# Patient Record
Sex: MALE | Race: WHITE | Employment: OTHER | URBAN - METROPOLITAN AREA
[De-identification: names, ages, dates, MRNs, and addresses within clinical notes are randomized per-mention and may not be internally consistent; named-entity substitution may affect disease eponyms.]

---

## 2018-03-13 ENCOUNTER — HOSPITAL ENCOUNTER (OUTPATIENT)
Dept: OCCUPATIONAL THERAPY | Facility: CLINIC | Age: 73
Setting detail: THERAPIES SERIES
End: 2018-03-13
Attending: INTERNAL MEDICINE
Payer: MEDICARE

## 2018-03-13 PROCEDURE — G8988 SELF CARE GOAL STATUS: HCPCS | Mod: GO,CI | Performed by: OCCUPATIONAL THERAPIST

## 2018-03-13 PROCEDURE — 97166 OT EVAL MOD COMPLEX 45 MIN: CPT | Mod: GO | Performed by: OCCUPATIONAL THERAPIST

## 2018-03-13 PROCEDURE — 97110 THERAPEUTIC EXERCISES: CPT | Mod: GO | Performed by: OCCUPATIONAL THERAPIST

## 2018-03-13 PROCEDURE — G8987 SELF CARE CURRENT STATUS: HCPCS | Mod: GO,CK | Performed by: OCCUPATIONAL THERAPIST

## 2018-03-13 PROCEDURE — 40000839 ZZH STATISTIC HAND THERAPY VISIT: Performed by: OCCUPATIONAL THERAPIST

## 2018-03-13 NOTE — PROGRESS NOTES
[facility]      OUTPATIENT OCCUPATIONAL THERAPY HAND EVALUATION  PLAN OF TREATMENT FOR OUTPATIENT REHABILITATION  (COMPLETE FOR INITIAL CLAIMS ONLY)  Patient's Last Name, First Name, M.I.  YOB: 1945  Martinez Montenegro                        Provider s Name: [facility] Medical Record No.  [id number]     Onset Date: 08/12/16    Start of Care Date: 03/13/18   Type:     ___PT  _X_OT   ___SLP    Medical Diagnosis: left CTS   Occupational Therapy Diagnosis:  Decreased functional use of the left hand due to pain and numbing effecting his ability to maintain grasp onto objects or good sleep; decreased BALD/IADLs independence.    Visits from SOC: 1      _________________________________________________________________________________  Plan of Treatment/Functional Goals:  Planned Therapy Interventions:  Ultrasound, ROM, Stretching, Education of splint wear, care, fit and precautions, Joint Protection Instruction, Self Care/Home Management, Home Program     Goals        1. Goal Target Task: Don/Doff pants, Don/Doff socks, Botton pants, Zipping       Goal Target Performance Level: Mild difficulty       Due Date: 04/27/18                   2. Goal Target Task: Hold dish rag and wash dishes, Sweep floors, Hold and lift pan, Open a tight or new jar       Goal Target Performance Level: Mild difficulty       Due Date: 04/27/18                   3.  Goal Target Task:  steering wheel, Pull car door open       Goal Target Performance Level: Mild difficulty       Due Date: 04/27/18     4. Goal Target Task: Staying asleep       Goal Target Performance Level: Mild difficulty       Due Date: 04/27/18     Treatment Frequency: Therapy Frequency: 2x week   Predicated Duration of Therapy Intervention:  Predicted Duration of Therapy Intervention (days/wks): 6 weeks    Keli Perera, OT         I  CERTIFY THE NEED FOR THESE SERVICES FURNISHED UNDER        THIS PLAN OF TREATMENT AND WHILE UNDER MY CARE .             Physician Signature               Date    X_____________________________________________________        Dr Sandra Burris         Certification Period:  03/13/18 to 04/27/18            Referring Physician:  Dr Sandra Burris    Initial Assessment        See Epic Evaluation Start of Care Date: 03/13/18          Thank you for referring Martinez To OT services to assist with pain relief and increased function of the hands.    If you have any questions or concerns, please contact me at [phone].    Keli Perera MA, OTR/L  [facility]

## 2018-03-13 NOTE — PROGRESS NOTES
"                        Occupational Therapy Evaluation     03/13/18 1258   Quick Adds   Quick Adds Certification;Fall Risk Screen   Therapy Certification   Certification date from 03/13/18   Certification date to 04/27/18   Medical Diagnosis left CTR   Certification I certify the need for these services furnished under this plan of treatment and while under my care.  (Physician co-signature of this document indicates review and certification of the therapy plan).   General Information/History   Start Of Care Date 03/13/18   Referring Physician Dr Sandra Burris   Orders Evaluate And Treat As Indicated   Orders Date 02/28/18   Medical Diagnosis CTS, laterality   Additional Occupational Profile Info/Pertinent history of current problem The patient is a 71 y/o male had been experiencing increased tigling in the left hand especially when sustain pinch/grasp and during night while sleeping. 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  Decreased functional use of the hands for BADL/IADLs.   Previous treatment or current condition wrist brace   How/Where did it occur With repetition/overuse;From insidious onset   Onset date of current episode/exacerbation 08/12/16   Chronicity Chronic   Hand Dominance Right   Affected side Left   Functional limitations perform activities of daily living;perform desired leisure / sports activities   Reported Symptoms Pain;Loss of Motion/Stiffness;Loss of strength;Numbness;Tingling   QuickDASH [Functional Disability Questionnaire; 0-100 (0=no dysfunction; 100=dysfunction)] (UEFI: 59/80 )   Important Activities boating, handling tools/furniture   Patient role/Employment history Retired   Patient/Family goals statement \"get rid of pain and numbness\"   Fall Risk Screen   Fall screen completed by OT   Have you fallen 2 or more times in the past year? No   Have you fallen and had an injury in the past year? No   Is patient a fall risk? No   Pain   Pain Primary Pain Report   Primary Pain " Report   Location left   Radiation Hand   Pain Quality Sharp;Aching   Frequency Constant   Scale 6/10   Pain Is Worse In The A.m.   Pain Is Exacerbated By Pinching;Gripping;Activity/movement   Pain Is Relieved By Splints  (movement)   Progression Since Onset Gradually Improving   Edema   Overall  - Left Moderate  (left ulnar distal aspect/styloid)   ROM   ROM AROM   AROM   AROM Elbow;Wrist   Wrist   Wrist Extension - Left 52   Wrist Flexion- Left 52   Wrist Supination- Left WNL   Wrist Pronation- Left WNL   Radial Deviation- Left 24   Ulnar Deviation- Left 22   Elbow   Elbow Extension- Left WFL   Elbow Flexion- Left WFL   Sensation Findings   Sensation Findings Sensation   Sensation   - Left Decreased Median Nerve Distribution   Strength   Strength Strength    Avg - Left 78#    Avg - Right 75#   Lateral Pinch - Left 22#   Lateral Pinch - Right 22#   Education Assessment   Preferred Learning Style Listening;Demonstration   Barriers to Learning No barriers   Therapy Interventions   Planned Therapy Interventions Ultrasound;ROM;Stretching;Education of splint wear, care, fit and precautions;Joint Protection Instruction;Self Care/Home Management;Home Program   Clinical Impression   Criteria for Skilled Therapeutic Interventions Met yes;treatment indicated   OT Diagnosis Decreased functional use of the left hand due to pain and numbing effecting his ability to maintain grasp onto objects or good sleep; decreased BALD/IADLs independence.   Influenced by the following impairments Pain;Edema;Decreased range of motion;Impaired sensation   Assessment of Occupational Performance 3-5 Performance Deficits   Identified Performance Deficits dressing, groomiung, home mgt. driving, leisure activiites   Clinical Decision Making (Complexity) Moderate complexity   Therapy Frequency 2x week    Predicted Duration of Therapy Intervention (days/wks) 6 weeks   Risks and Benefits of Treatment have been explained. Yes   Patient, Family  & other staff in agreement with plan of care Yes   Hand Goals   Hand Goals Driving;Sleeping;Household Chores;Dressing   Dressing   Current Functional Task Dressing LB;Buttoning;Zipping   Previous Performance Level Independent   Current Performance Level Moderate difficulty   Goal Target Task Don/Doff pants;Don/Doff socks;Botton pants;Zipping   Goal Target Performance Level Mild difficulty   Due Date 04/27/18   Household Chores   Current Functional Task Washing and drying dishes;Sweeping;Carrying   Previous Performance Level Independent   Current Performance Level Moderate difficulty   Goal Target Task Hold dish rag and wash dishes;Sweep floors;Hold and lift pan;Open a tight or new jar   Goal Target Performance Level Mild difficulty   Due Date 04/27/18   Driving   Current Functional Task Holding steering wheel;Opening car door   Previous Performance Level Independent   Current Performance Level Severe difficulty   Goal Target Task  steering wheel;Pull car door open   Goal Target Performance Level Mild difficulty   Due Date 04/27/18   Sleeping   Current Functional Task Sleeping through the night   Previous Performance Level Mild difficulty   Current Performance Level Severe difficulty   Goal Target Task Staying asleep   Goal Target Performance Level Mild difficulty   Due Date 04/27/18   Total Evaluation Time   Total Evaluation Time (Minutes) 30       Thank you for referring Martinez To OT services to assist with decrease pain and numbing of the hands to improve function.    If you have any questions or concerns, please contact me at [phone].    Keli Perera MA, OTR/L  [facility]

## 2018-03-19 ENCOUNTER — HOSPITAL ENCOUNTER (OUTPATIENT)
Dept: OCCUPATIONAL THERAPY | Facility: CLINIC | Age: 73
Setting detail: THERAPIES SERIES
End: 2018-03-19
Attending: INTERNAL MEDICINE
Payer: MEDICARE

## 2018-03-19 PROCEDURE — 97110 THERAPEUTIC EXERCISES: CPT | Mod: GO | Performed by: OCCUPATIONAL THERAPIST

## 2018-03-19 PROCEDURE — 97140 MANUAL THERAPY 1/> REGIONS: CPT | Mod: GO | Performed by: OCCUPATIONAL THERAPIST

## 2018-03-19 PROCEDURE — 97035 APP MDLTY 1+ULTRASOUND EA 15: CPT | Mod: GO | Performed by: OCCUPATIONAL THERAPIST

## 2018-03-19 PROCEDURE — 40000839 ZZH STATISTIC HAND THERAPY VISIT: Performed by: OCCUPATIONAL THERAPIST

## 2018-03-21 ENCOUNTER — HOSPITAL ENCOUNTER (OUTPATIENT)
Dept: OCCUPATIONAL THERAPY | Facility: CLINIC | Age: 73
Setting detail: THERAPIES SERIES
End: 2018-03-21
Attending: INTERNAL MEDICINE
Payer: MEDICARE

## 2018-03-21 PROCEDURE — 97140 MANUAL THERAPY 1/> REGIONS: CPT | Mod: GO

## 2018-03-21 PROCEDURE — 97035 APP MDLTY 1+ULTRASOUND EA 15: CPT | Mod: GO

## 2018-03-21 PROCEDURE — 40000839 ZZH STATISTIC HAND THERAPY VISIT

## 2018-03-21 PROCEDURE — 97110 THERAPEUTIC EXERCISES: CPT | Mod: GO

## 2018-03-28 ENCOUNTER — HOSPITAL ENCOUNTER (OUTPATIENT)
Dept: OCCUPATIONAL THERAPY | Facility: CLINIC | Age: 73
Setting detail: THERAPIES SERIES
End: 2018-03-28
Attending: INTERNAL MEDICINE
Payer: MEDICARE

## 2018-03-28 PROCEDURE — 40000839 ZZH STATISTIC HAND THERAPY VISIT

## 2018-03-28 PROCEDURE — 97530 THERAPEUTIC ACTIVITIES: CPT | Mod: GO,59

## 2018-03-28 PROCEDURE — 97140 MANUAL THERAPY 1/> REGIONS: CPT | Mod: GO

## 2018-03-28 PROCEDURE — 97110 THERAPEUTIC EXERCISES: CPT | Mod: GO

## 2018-03-28 PROCEDURE — 97035 APP MDLTY 1+ULTRASOUND EA 15: CPT | Mod: GO

## 2018-04-04 ENCOUNTER — HOSPITAL ENCOUNTER (OUTPATIENT)
Dept: OCCUPATIONAL THERAPY | Facility: CLINIC | Age: 73
Setting detail: THERAPIES SERIES
End: 2018-04-04
Attending: INTERNAL MEDICINE
Payer: MEDICARE

## 2018-04-04 PROCEDURE — 40000839 ZZH STATISTIC HAND THERAPY VISIT: Performed by: OCCUPATIONAL THERAPIST

## 2018-04-04 PROCEDURE — 97110 THERAPEUTIC EXERCISES: CPT | Mod: GO | Performed by: OCCUPATIONAL THERAPIST

## 2018-04-04 PROCEDURE — 97035 APP MDLTY 1+ULTRASOUND EA 15: CPT | Mod: GO | Performed by: OCCUPATIONAL THERAPIST

## 2018-04-04 PROCEDURE — 97140 MANUAL THERAPY 1/> REGIONS: CPT | Mod: GO | Performed by: OCCUPATIONAL THERAPIST

## 2018-04-06 ENCOUNTER — HOSPITAL ENCOUNTER (OUTPATIENT)
Dept: OCCUPATIONAL THERAPY | Facility: CLINIC | Age: 73
Setting detail: THERAPIES SERIES
End: 2018-04-06
Attending: INTERNAL MEDICINE
Payer: MEDICARE

## 2018-04-06 PROCEDURE — 97035 APP MDLTY 1+ULTRASOUND EA 15: CPT | Mod: GO | Performed by: OCCUPATIONAL THERAPIST

## 2018-04-06 PROCEDURE — 97110 THERAPEUTIC EXERCISES: CPT | Mod: GO | Performed by: OCCUPATIONAL THERAPIST

## 2018-04-06 PROCEDURE — 40000839 ZZH STATISTIC HAND THERAPY VISIT: Performed by: OCCUPATIONAL THERAPIST

## 2018-04-06 PROCEDURE — 97140 MANUAL THERAPY 1/> REGIONS: CPT | Mod: GO | Performed by: OCCUPATIONAL THERAPIST

## 2018-04-09 ENCOUNTER — HOSPITAL ENCOUNTER (OUTPATIENT)
Dept: OCCUPATIONAL THERAPY | Facility: CLINIC | Age: 73
Setting detail: THERAPIES SERIES
End: 2018-04-09
Attending: INTERNAL MEDICINE
Payer: MEDICARE

## 2018-04-09 PROCEDURE — 97035 APP MDLTY 1+ULTRASOUND EA 15: CPT | Mod: GO | Performed by: OCCUPATIONAL THERAPIST

## 2018-04-09 PROCEDURE — 97140 MANUAL THERAPY 1/> REGIONS: CPT | Mod: GO | Performed by: OCCUPATIONAL THERAPIST

## 2018-04-09 PROCEDURE — 40000839 ZZH STATISTIC HAND THERAPY VISIT: Performed by: OCCUPATIONAL THERAPIST

## 2018-04-09 PROCEDURE — 97110 THERAPEUTIC EXERCISES: CPT | Mod: GO | Performed by: OCCUPATIONAL THERAPIST

## 2018-04-18 ENCOUNTER — HOSPITAL ENCOUNTER (OUTPATIENT)
Dept: OCCUPATIONAL THERAPY | Facility: CLINIC | Age: 73
Setting detail: THERAPIES SERIES
End: 2018-04-18
Attending: INTERNAL MEDICINE
Payer: MEDICARE

## 2018-04-18 PROCEDURE — 97110 THERAPEUTIC EXERCISES: CPT | Mod: GO | Performed by: OCCUPATIONAL THERAPIST

## 2018-04-18 PROCEDURE — 97140 MANUAL THERAPY 1/> REGIONS: CPT | Mod: GO | Performed by: OCCUPATIONAL THERAPIST

## 2018-04-18 PROCEDURE — 97035 APP MDLTY 1+ULTRASOUND EA 15: CPT | Mod: GO | Performed by: OCCUPATIONAL THERAPIST

## 2018-04-18 PROCEDURE — 40000839 ZZH STATISTIC HAND THERAPY VISIT: Performed by: OCCUPATIONAL THERAPIST

## 2018-04-18 NOTE — PROGRESS NOTES
[facility]      OUTPATIENT OCCUPATIONAL THERAPY HAND Progress and Re-certification    PLAN OF TREATMENT FOR OUTPATIENT REHABILITATION    Patient's Last Name, First Name, M.I.                                             YOB: 1945  Martinez Montenegro      Provider s Name: [facility] Medical Record No.  [id number]      Onset Date: 08/12/16                          Start of Care Date: 03/13/18   Type:     ___PT  _X_OT   ___SLP                          Medical Diagnosis: left CTS   Occupational Therapy Diagnosis:  Decreased functional use of the left hand due to pain and numbing effecting his ability to maintain grasp onto objects or good sleep; decreased BALD/IADLs independence.     Visits from SOC: 1        _________________________________________________________________________________  Plan of Treatment/Functional Goals:  Planned Therapy Interventions:  Ultrasound, ROM, Stretching, Education of splint wear, care, fit and precautions, Joint Protection Instruction, Self Care/Home Management, Home Program      Goals       1. Goal Target Task: Don/Doff pants, Don/Doff socks, Botton pants, Zipping       Goal Target Performance Level: Mild difficulty       Due Date: 04/27/18  Goal met 4/18/18           2. Goal Target Task: Hold dish rag and wash dishes, Sweep floors, Hold and lift pan, Open a tight or new jar       Goal Target Performance Level: Mild difficulty       Due Date: 05/18/18     Progressing, extend goal       3.  Goal Target Task:  steering wheel, Pull car door open       Goal Target Performance Level: Mild difficulty       Due Date: 05/18/18  Progressing, extend goal      4. Goal Target Task: Staying asleep       Goal Target Performance Level: Mild difficulty       Due Date: 05/18/18  Progressing, extend goal      Treatment Frequency: Therapy Frequency: 2x week    Predicated Duration of Therapy Intervention:  Predicted Duration of Therapy Intervention (days/wks): 4 weeks     Keli Perera, OT            I CERTIFY THE NEED FOR THESE SERVICES FURNISHED UNDER                             THIS PLAN OF TREATMENT AND WHILE UNDER MY CARE .               Physician Signature                                                         Date     X_____________________________________________________         Dr Sandra Burris                          Re-Certification Period:  04/18/18 to 05/18/18      Referring Physician:  Dr Sandra Burris     Initial Assessment                                                                                                                                                     See Epic Evaluation Start of Care Date: 03/13/18             Thank you for referring Martinez To OT services to assist with pain relief and increased function of the hands.     If you have any questions or concerns, please contact me at [phone].     Keli Perera MA, OTR/L  [facility]

## 2018-04-18 NOTE — PROGRESS NOTES
Occupational Therapy Progress Note     04/18/18 0837   Notes   Note Type Progress Note   Signing Clinician's Name / Credentials   Signing clinician's name / credentials Keli COLLINS/MAI   Providers   Referring Physician Dr Sandra Burris   Reporting Period   Reporting period from 03/13/18   Reporting period to 05/18/18   OT Medicare Only G-code   G-code Self Care   Self Care   Self Care Current Status,  (eval/re-eval & every progress note) CK: 40-59% impairment   Current Self Care Modifier Rationale UEFI, POG and clinical judgement   Self Care Goal,  (eval/re-eval, every progress note & discharge) CI: 1-19% impairment   General Information   Rxs Used 7/12   Medical Diagnosis CTS, laterality   Orders Evaluate And Treat As Indicated   Start Of Care Date 03/13/18   Onset date of current episode/exacerbation 08/12/16   Subjective Measures   Subjective The patient requested and agreed to extend OT services for 4 weeks.  He will continue to advance HEP as recommended.   Initial Pain level 3/10   Patient Reported % Functional Improvement 10%   Functional Improvement Reported Self care activities;Carrying;Prehension   QuickDASH [Functional Disability Questionnaire; 0-100 (0=no dysfunction; 100=dysfunction)] (UEFI: 70/80  slight improvement)   Objective Measures   Objective Measures Strength   Strength   Location Bilat    right 70# and left 66#   Lateral Pinch right 24# and left 21#   Modalities   Modalities Ultrasound   Ultrasound -Type Continuous   Skilled Interventions To Increase Blood Flow For Improved Healing;To Increase Tissue Extensibility;To Increase Tissue Excursion;To Enhance Tendon Gliding;To Decrease Pain;To Decrease Inflammation   Intensity 1.0 W/cm2   Duration (does not include the 3-5 min set up/clean up time) 8 min  (2 minute set up)   Frequency 3 MHz   Location left radiualis longus; thumb to elbow   Positioning sitting   Therapeutic Exercise   Therapeutic  Exercise Other Exercises/Activities   Skilled Interventions To Increase Blood Flow For Improved Healing;To Increase Tissue Extensibility;To Decrease Pain;To Enhance Joint Rom   Minutes of Treatment 20   Other Exer/Activities/Educ   Exercise 1 Reassessed objectives, extend POC, reviewed HEP and contrast bath   Manual   Manual STM;Wrist Joint Mobilization   Skilled Interventions To Increase Blood Flow For Improved Healing;To Increase Tissue Extensibility;To Decrease Pain;To Decrease Inflammation   Minutes of Treatment 10   STM Extensors;Tool Assisted   Position Sitting   Description/ Technique Gau sha median tool applied moderate pressure to thenar creaseecross friction massage, K-tape applied to palm to forearm over extensor muscle groups with 20% pull with wrist extended.     Hand Goals   Hand Goals Driving;Sleeping;Household Chores;Dressing   Dressing   Current Functional Task Dressing LB;Buttoning;Zipping   Previous Performance Level Independent   Current Performance Level Mild difficulty   Goal Target Task Don/Doff pants;Don/Doff socks;Botton pants;Zipping   Goal Target Performance Level Mild difficulty   Due Date 04/27/18   Date Goal Met 04/18/18   Household Chores   Current Functional Task Washing and drying dishes;Sweeping;Carrying   Previous Performance Level Independent   Current Performance Level Moderate difficulty   Goal Target Task Hold dish rag and wash dishes;Sweep floors;Hold and lift pan;Open a tight or new jar   Goal Target Performance Level Mild difficulty   Due Date 05/18/18  (extended goal)   Driving   Current Functional Task Holding steering wheel;Opening car door   Previous Performance Level Independent   Current Performance Level Severe difficulty   Goal Target Task  steering wheel;Pull car door open   Goal Target Performance Level Mild difficulty   Due Date 05/18/18   Sleeping   Current Functional Task Sleeping through the night   Previous Performance Level Mild difficulty   Current  Performance Level Moderate difficulty   Goal Target Task Staying asleep   Goal Target Performance Level Mild difficulty   Due Date 05/18/18   Assessment   Clinical Impression(s) Comments Positive DeQuervain's left.   Response to Therapy: Improvements ROM;Flexibility;Pain   Plan   Updates to plan of care extend POC 4 weeks   Plan US, STM/with Gau Sha tool assist, TE/TA and home programming   Total Session Time   Timed Code Treatment Minutes 40   Total Treatment Time (sum of timed and untimed services) 40       Thank you for referring Martinez To OT services to assist with improve hand function for daily tasks/activities.    If you have any questions or concerns, please contact me at [phone].    Keli Perera MA, OTR/L  [facility]

## 2018-04-25 ENCOUNTER — HOSPITAL ENCOUNTER (OUTPATIENT)
Dept: OCCUPATIONAL THERAPY | Facility: CLINIC | Age: 73
Setting detail: THERAPIES SERIES
End: 2018-04-25
Attending: INTERNAL MEDICINE
Payer: MEDICARE

## 2018-04-25 PROCEDURE — 97110 THERAPEUTIC EXERCISES: CPT | Mod: GO | Performed by: OCCUPATIONAL THERAPIST

## 2018-04-25 PROCEDURE — 97035 APP MDLTY 1+ULTRASOUND EA 15: CPT | Mod: GO | Performed by: OCCUPATIONAL THERAPIST

## 2018-04-25 PROCEDURE — 97140 MANUAL THERAPY 1/> REGIONS: CPT | Mod: GO | Performed by: OCCUPATIONAL THERAPIST

## 2018-04-25 PROCEDURE — 40000839 ZZH STATISTIC HAND THERAPY VISIT: Performed by: OCCUPATIONAL THERAPIST

## 2018-04-30 ENCOUNTER — HOSPITAL ENCOUNTER (OUTPATIENT)
Dept: OCCUPATIONAL THERAPY | Facility: CLINIC | Age: 73
Setting detail: THERAPIES SERIES
End: 2018-04-30
Attending: INTERNAL MEDICINE
Payer: MEDICARE

## 2018-04-30 PROCEDURE — 97035 APP MDLTY 1+ULTRASOUND EA 15: CPT | Mod: GO | Performed by: OCCUPATIONAL THERAPIST

## 2018-04-30 PROCEDURE — 97110 THERAPEUTIC EXERCISES: CPT | Mod: GO | Performed by: OCCUPATIONAL THERAPIST

## 2018-04-30 PROCEDURE — 40000839 ZZH STATISTIC HAND THERAPY VISIT: Performed by: OCCUPATIONAL THERAPIST

## 2018-04-30 PROCEDURE — 97140 MANUAL THERAPY 1/> REGIONS: CPT | Mod: GO | Performed by: OCCUPATIONAL THERAPIST

## 2018-04-30 NOTE — ADDENDUM NOTE
Encounter addended by: Sonali Amador OT on: 4/30/2018 10:06 AM<BR>     Actions taken: Flowsheet data copied forward, Flowsheet accepted

## 2018-05-02 ENCOUNTER — HOSPITAL ENCOUNTER (OUTPATIENT)
Dept: OCCUPATIONAL THERAPY | Facility: CLINIC | Age: 73
Setting detail: THERAPIES SERIES
End: 2018-05-02
Attending: INTERNAL MEDICINE
Payer: MEDICARE

## 2018-05-02 PROCEDURE — 40000839 ZZH STATISTIC HAND THERAPY VISIT: Performed by: OCCUPATIONAL THERAPIST

## 2018-05-02 PROCEDURE — 97110 THERAPEUTIC EXERCISES: CPT | Mod: GO | Performed by: OCCUPATIONAL THERAPIST

## 2018-05-02 PROCEDURE — 97140 MANUAL THERAPY 1/> REGIONS: CPT | Mod: GO | Performed by: OCCUPATIONAL THERAPIST

## 2018-05-02 PROCEDURE — 97035 APP MDLTY 1+ULTRASOUND EA 15: CPT | Mod: GO | Performed by: OCCUPATIONAL THERAPIST

## 2018-05-02 NOTE — PROGRESS NOTES
Occupational Therapy Discharge Progress Note     05/02/18 0829   Notes   Note Type Discharge Summary   Signing Clinician's Name / Credentials   Signing clinician's name / credentials KARINA Kim/L   Providers   Referring Physician Dr Sandra Burris   Reporting Period   Reporting period from 03/13/18   Reporting period to 05/18/18   OT Medicare Only G-code   G-code Self Care   Self Care   Current Self Care Modifier Rationale UEFI, POG and clinical judgement   Self Care Goal,  (eval/re-eval, every progress note & discharge) CI: 1-19% impairment   Self Care Discharge Status,  (discharge) CI: 1-19% impairment   Discharge Self Care Modifier Rationale UEFI, POG and clinical judgement   General Information   Rxs Used 10/12   Medical Diagnosis CTS, laterality   Orders Evaluate And Treat As Indicated   Start Of Care Date 03/13/18   Onset date of current episode/exacerbation 08/12/16   Subjective Measures   Subjective Patient agrees to discharge this date and continue HEP.   QuickDASH [Functional Disability Questionnaire; 0-100 (0=no dysfunction; 100=dysfunction)] (UEFI: 73/80 improved)   Strength   Location Bilat    right 68# and left 65#   Lateral Pinch right 28# and left 25#   Taylor Pinch right 21.5# and left 21#   Other improved   Modalities   Modalities Ultrasound   Ultrasound -Type Continuous   Skilled Interventions To Increase Blood Flow For Improved Healing;To Increase Tissue Extensibility;To Increase Tissue Excursion;To Enhance Tendon Gliding;To Decrease Pain;To Decrease Inflammation   Intensity 1.0 W/cm2   Duration (does not include the 3-5 min set up/clean up time) 10 min  (2 minute set up)   Frequency 3 MHz   Location left radiualis longus; thumb to elbow   Positioning sitting   Therapeutic Exercise   Therapeutic Exercise Other Exercises/Activities   Minutes of Treatment 20   Other Exer/Activities/Educ   Exercise 1 Reviewed goals, UEFI, objectives and  home programming for discharge   Exercise 2 Educated and demonstrated use of dycem and blue foam block for hand function/strength   Manual   Manual STM;Wrist Joint Mobilization   Skilled Interventions To Increase Blood Flow For Improved Healing;To Increase Tissue Extensibility;To Decrease Pain;To Decrease Inflammation   Minutes of Treatment 12   STM Extensors;Tool Assisted   Position Sitting   Description/ Technique Gerri townsend median tool applied moderate pressure to thenar creaseecross friction massage, K-tape applied to palm to forearm over extensor muscle groups with 20% pull with wrist extended.     Hand Goals   Hand Goals Driving;Sleeping;Household Chores;Dressing   Dressing   Current Functional Task Dressing LB;Buttoning;Zipping   Previous Performance Level Independent   Current Performance Level Mild difficulty   Goal Target Task Don/Doff pants;Don/Doff socks;Botton pants;Zipping   Goal Target Performance Level Mild difficulty   Due Date 04/27/18   Date Goal Met 04/27/18   Household Chores   Current Functional Task Washing and drying dishes;Sweeping;Carrying   Previous Performance Level Independent   Goal Target Task Hold dish rag and wash dishes;Sweep floors;Hold and lift pan;Open a tight or new jar   Goal Target Performance Level Mild difficulty   Due Date 05/18/18   Date Goal Met 05/02/18   Driving   Current Functional Task Holding steering wheel;Opening car door   Previous Performance Level Independent   Current Performance Level Mild difficulty   Goal Target Task  steering wheel;Pull car door open   Goal Target Performance Level Mild difficulty   Due Date 05/18/18   Date Goal Met 05/02/18   Sleeping   Current Functional Task Sleeping through the night   Previous Performance Level Mild difficulty   Current Performance Level Mild difficulty   Goal Target Task Staying asleep   Goal Target Performance Level Mild difficulty   Due Date 05/18/18   Date Goal Met 05/02/18   Assessment   Clinical Impression(s)  Comments Improved functional use of the hands for BADL/IADLs.  Improved median nerve input , per report.   Response to Therapy: Improvements ROM;Flexibility;Strength;Edema;Pain;Self Care Skills;Paresthesias;Sensitivity   Equipment   Equipment dycem and blue foam block   Plan   Home program patient to continue all OT recommendations and HEP independently   Updates to plan of care discharge this date   Total Session Time   Timed Code Treatment Minutes 44   Total Treatment Time (sum of timed and untimed services) 44         Thank you for referring Martinez  To OT services to assist with improve hand function for daily tasks/activities.    If you have any questions or concerns, please contact me at [phone].    Keli Perera MA, OTR/L  [facility]

## 2018-05-03 NOTE — ADDENDUM NOTE
Encounter addended by: Sonali Amador OT on: 5/2/2018 11:12 PM<BR>     Actions taken: Flowsheet data copied forward, Flowsheet accepted

## 2019-07-29 NOTE — ADDENDUM NOTE
Encounter addended by: Cheryl Ulloa OT on: 7/29/2019 3:56 AM   Actions taken: Flowsheet data copied forward, Flowsheet accepted

## 2025-01-07 ENCOUNTER — TRANSCRIBE ORDERS (OUTPATIENT)
Dept: OTHER | Age: 80
End: 2025-01-07

## 2025-01-07 DIAGNOSIS — M16.12 PRIMARY OSTEOARTHRITIS OF LEFT HIP: Primary | ICD-10-CM

## 2025-01-29 ENCOUNTER — THERAPY VISIT (OUTPATIENT)
Dept: PHYSICAL THERAPY | Facility: CLINIC | Age: 80
End: 2025-01-29
Attending: ORTHOPAEDIC SURGERY
Payer: MEDICARE

## 2025-01-29 DIAGNOSIS — M16.12 PRIMARY OSTEOARTHRITIS OF LEFT HIP: Primary | ICD-10-CM

## 2025-01-29 PROCEDURE — 97530 THERAPEUTIC ACTIVITIES: CPT | Mod: GP | Performed by: PHYSICAL THERAPIST

## 2025-01-29 PROCEDURE — 97110 THERAPEUTIC EXERCISES: CPT | Mod: GP | Performed by: PHYSICAL THERAPIST

## 2025-01-29 PROCEDURE — 97161 PT EVAL LOW COMPLEX 20 MIN: CPT | Mod: GP | Performed by: PHYSICAL THERAPIST

## 2025-01-29 ASSESSMENT — ACTIVITIES OF DAILY LIVING (ADL)
PLEASE_INDICATE_YOR_PRIMARY_REASON_FOR_REFERRAL_TO_THERAPY:: HIP
PUTTING_ON_SOCKS_AND_SHOES: MODERATE DIFFICULTY
GOING_UP_1_FLIGHT_OF_STAIRS: MODERATE DIFFICULTY
RECREATIONAL ACTIVITIES: MODERATE DIFFICULTY
STEPPING_UP_AND_DOWN_CURBS: MODERATE DIFFICULTY
SPORTS_SCORE(%): INCOMPLETE
GETTING INTO AND OUT OF AN AVERAGE CAR: MODERATE DIFFICULTY
LIGHT_TO_MODERATE_WORK: MODERATE DIFFICULTY
ADL_SCORE(%): INCOMPLETE
SPORTS_TOTAL_ITEM_SCORE: INCOMPLETE
WALKING_DOWN_STEEP_HILLS: MODERATE DIFFICULTY
ADL_HIGHEST_POTENTIAL_SCORE: 60
ADL_TOTAL_ITEM_SCORE: INCOMPLETE
WALKING_15_MINUTES_OR_GREATER: MODERATE DIFFICULTY
HEAVY_WORK: EXTREME DIFFICULTY
HOS_ADL_HIGHEST_POTENTIAL_SCORE: 60
ROLLING OVER IN BED: MODERATE DIFFICULTY
WALKING_FOR_APPROXIMATELY_10_MINUTES: MODERATE DIFFICULTY
TWISTING/PIVOTING ON INVOLVED LEG: EXTREME DIFFICULTY
RECREATIONAL_ACTIVITIES: MODERATE DIFFICULTY
HOS_ADL_SCORE(%): 45
GOING DOWN 1 FLIGHT OF STAIRS: MODERATE DIFFICULTY
WALKING_15_MINUTES_OR_GREATER: MODERATE DIFFICULTY
STEPPING UP AND DOWN CURBS: MODERATE DIFFICULTY
GETTING_INTO_AND_OUT_OF_A_BATHTUB: MODERATE DIFFICULTY
ADL_COUNT: 15
SITTING_FOR_15_MINUTES: MODERATE DIFFICULTY
WALKING_APPROXIMATELY_10_MINUTES: MODERATE DIFFICULTY
SPORTS_HIGHEST_POTENTIAL_SCORE: INCOMPLETE
HOS_ADL_ITEM_SCORE_TOTAL: 27
ROLLING_OVER_IN_BED: MODERATE DIFFICULTY
GETTING_INTO_AND_OUT_OF_AN_AVERAGE_CAR: MODERATE DIFFICULTY
PUTTING ON SOCKS AND SHOES: MODERATE DIFFICULTY
WALKING_UP_STEEP_HILLS: MODERATE DIFFICULTY
WALKING_UP_STEEP_HILLS: MODERATE DIFFICULTY
GOING_DOWN_1_FLIGHT_OF_STAIRS: MODERATE DIFFICULTY
LIGHT_TO_MODERATE_WORK: MODERATE DIFFICULTY
TWISTING/PIVOTING_ON_INVOLVED_LEG: EXTREME DIFFICULTY
HEAVY_WORK: EXTREME DIFFICULTY
DEEP_SQUATTING: EXTREME DIFFICULTY
GOING UP 1 FLIGHT OF STAIRS: MODERATE DIFFICULTY
WALKING_DOWN_STEEP_HILLS: MODERATE DIFFICULTY
SITTING FOR 15 MINUTES: MODERATE DIFFICULTY
GETTING_INTO_AND_OUT_OF_A_BATHTUB: MODERATE DIFFICULTY
DEEP SQUATTING: EXTREME DIFFICULTY
SPORTS_COUNT: INCOMPLETE

## 2025-01-29 NOTE — PROGRESS NOTES
PHYSICAL THERAPY EVALUATION  Type of Visit: Evaluation       Fall Risk Screen:  Fall screen completed by: PT  Have you fallen 2 or more times in the past year?: Yes  Have you fallen and had an injury in the past year?: No  Is patient a fall risk?: Department fall risk interventions implemented    Subjective         Presenting condition or subjective complaint:  Pt to PT for pre op training  prior to L WOLF on 2/4/25. Pt will have surgery at [facility] and will likely go home the next day and would like to have home care PT.  Date of onset: 07/29/24 (notes worsening L hip pain over the last 6 months)    Relevant medical history:   Patient Active Problem List   Diagnosis   Glaucoma   Elevated blood pressure reading   Thumb pain   History of shingles   Elevated fasting blood sugar   Epiretinal membrane, left eye   Acute retinal necrosis of left eye   Family history of prostate cancer   Adenomatous colon polyp   Obesity (BMI 30.0-34.9)   Benign non-nodular prostatic hyperplasia with lower urinary tract symptoms   Open-angle glaucoma of both eyes   Chronic idiopathic thrombocytopenia (HCC)   Left carpal tunnel syndrome   Arthritis   Ocular herpes zoster   Amyloidosis (HCC)   Dislocated IOL (intraocular lens), posterior, left   Right knee pain   Localized osteoarthritis of right knee   Difficulty walking   HTN, goal below 140/90   Arthritis of right knee   Afferent pupillary defect   Cataract of both eyes   History of retinal detachment   Hyperlipidemia   Primary open-angle glaucoma, bilateral, moderate stage   Dates & types of surgery:  Past Medical History:   Diagnosis Date   Anal fissure 1999   Elevated blood pressure   Hematuria   Hypertension   Nuclear sclerosis     Past Surgical History:   Procedure Laterality Date   ROTATOR CUFF REPAIR 6-2005   right side   VITRECTOMY,STRIP EPIRETINAL MEMBRANE Left 2/05/15        Prior diagnostic imaging/testing results:       Prior therapy history for the same diagnosis,  illness or injury:        Prior Level of Function  Transfers: Assistive person  Ambulation: Assistive equipment, 4WW x 3 months, also has a cane x 4 months prior to that  ADL: Assistive person  IADL:  pt does a little vacuuming but wife does meals, laundry, driving    Living Environment  Social support:   Wife  Type of home:   Rambler with basement  Stairs to enter the home:       5 steps with railing  Ramp:   none  Stairs inside the home:       10 or so to basement  Help at home:  Wife helps, kids help with yardwork  Equipment owned:   4WW, has 3WW, cane, raised commode, grab bars in 1 bathroom, has reacher    Employment:    Retired  Hobbies/Interests:  used to do a lot of boating, watches TV    Patient goals for therapy:  Pre op L WOLF training    Pain assessment: Pain present  See objective evaluation for additional pain details     Objective   HIP EVALUATION  PAIN: Pain Level at Rest: 0/10  Pain Level with Use: 8/10  Pain Location: hip  Pain Frequency: intermittent  Pain is Exacerbated By: moving L hip, twisting, standing and walking, stairs  Pain is Relieved By: rest and sitting  Pain Progression: Worsened  INTEGUMENTARY (edema, incisions):  slight L knee swelling, scar healing well  POSTURE:  poor sitting posture, kyphotic lumbar spine, forward head, in standing trunk flexed, head flexed posture  GAIT:   Weightbearing Status: WBAT  Assistive Device(s): Walker (four wheeled)  Gait Deviations: Antalgic  Stride length decreased  Catina decreased  Trunk flexed, lateral lurch present, step to pattern  BALANCE/PROPRIOCEPTION:  deferred  WEIGHTBEARING ALIGNMENT:   NON-WEIGHTBEARING ALIGNMENT:    ROM:  supine L hip extension to flexion 15 to 45 degrees, abduction to 20 degrees, quite limited and painful at end range  PELVIC/SI SCREEN:   STRENGTH:  poor L QS, painful SLR on L, can do SAQ without pain increase  LE FLEXIBILITY:   SPECIAL TESTS:  FUNCTIONAL TESTS: Pt needs to use Ue's for sit to stand transfer, used R LE  to assist L LE sit to supine. Min assist supine to sit with pt using PT's hand to pull up.   PALPATION:   JOINT MOBILITY:     Assessment & Plan   CLINICAL IMPRESSIONS  Medical Diagnosis: Primary osteoarthritis of left hip    Treatment Diagnosis: Primary osteoarthritis of left hip, L WOLF scheduled for 1/29/25   Impression/Assessment: Patient is a 79 year old male with pre op L WOLF on 2/4/25 training.  The following significant findings have been identified: Pain, Decreased ROM/flexibility, Decreased joint mobility, Decreased strength, Impaired balance, Impaired gait, Impaired muscle performance, Decreased activity tolerance, and Impaired posture. These impairments interfere with their ability to perform self care tasks, work tasks, recreational activities, household chores, driving , household mobility, and community mobility as compared to previous level of function.     Clinical Decision Making (Complexity):  Clinical Presentation: Stable/Uncomplicated  Clinical Presentation Rationale: based on medical and personal factors listed in PT evaluation  Clinical Decision Making (Complexity): Low complexity    PLAN OF CARE  Treatment Interventions:  Interventions: Therapeutic Activity, Therapeutic Exercise    Long Term Goals     PT Goal 1  Goal Identifier: Pre op training  Goal Description: Instruct pt and spouse in pre op training for L WOLF , WOLF exercises, precautions and mobility  Goal Progress: met goal after today's session  Target Date: 01/29/25  Date Met: 01/29/25      Frequency of Treatment: 1x pre op visit  Duration of Treatment: today/1 visit    Recommended Referrals to Other Professionals:   Education Assessment:   Learner/Method: Patient;Significant Other;Reading;Demonstration;Pictures/Video;No Barriers to Learning  Education Comments: Pre op L WOLF training    Risks and benefits of evaluation/treatment have been explained.   Patient/Family/caregiver agrees with Plan of Care.     Evaluation Time:     PT Christin  Low Complexity Minutes (93960): 20       Signing Clinician: Aaron Darling PT        [facility]                                                                                   OUTPATIENT PHYSICAL THERAPY      PLAN OF TREATMENT FOR OUTPATIENT REHABILITATION   Patient's Last Name, First Name, Martinez Kelley YOB: 1945   Provider's Name   [facility]   Medical Record No.  [id number]     Onset Date: 07/29/24 (notes worsening L hip pain over the last 6 months)  Start of Care Date: 01/29/25     Medical Diagnosis:  Primary osteoarthritis of left hip      PT Treatment Diagnosis:  Primary osteoarthritis of left hip, L WOLF scheduled for 1/29/25 Plan of Treatment  Frequency/Duration: 1x pre op visit/ today/1 visit    Certification date from 01/29/25 to 01/29/25         See note for plan of treatment details and functional goals     Aaron Darling, PT                         I CERTIFY THE NEED FOR THESE SERVICES FURNISHED UNDER        THIS PLAN OF TREATMENT AND WHILE UNDER MY CARE .             Physician Signature               Date    X_____________________________________________________                  Referring Provider:  Phil Mckinney    Initial Assessment  See Epic Evaluation- Start of Care Date: 01/29/25

## 2025-02-10 ENCOUNTER — LAB REQUISITION (OUTPATIENT)
Dept: LAB | Facility: CLINIC | Age: 80
End: 2025-02-10

## 2025-02-10 DIAGNOSIS — Z11.1 ENCOUNTER FOR SCREENING FOR RESPIRATORY TUBERCULOSIS: ICD-10-CM

## 2025-02-11 PROCEDURE — P9603 ONE-WAY ALLOW PRORATED MILES: HCPCS | Performed by: FAMILY MEDICINE

## 2025-02-11 PROCEDURE — 36415 COLL VENOUS BLD VENIPUNCTURE: CPT | Performed by: FAMILY MEDICINE

## 2025-02-11 PROCEDURE — 86481 TB AG RESPONSE T-CELL SUSP: CPT | Performed by: FAMILY MEDICINE

## 2025-02-12 ENCOUNTER — LAB REQUISITION (OUTPATIENT)
Dept: LAB | Facility: CLINIC | Age: 80
End: 2025-02-12
Payer: COMMERCIAL

## 2025-02-12 DIAGNOSIS — Z47.1 AFTERCARE FOLLOWING JOINT REPLACEMENT SURGERY: ICD-10-CM

## 2025-02-12 LAB
GAMMA INTERFERON BACKGROUND BLD IA-ACNC: 0.03 IU/ML
M TB IFN-G BLD-IMP: NEGATIVE
M TB IFN-G CD4+ BCKGRND COR BLD-ACNC: 1.11 IU/ML
MITOGEN IGNF BCKGRD COR BLD-ACNC: 0 IU/ML
MITOGEN IGNF BCKGRD COR BLD-ACNC: 0 IU/ML
QUANTIFERON MITOGEN: 1.14 IU/ML
QUANTIFERON NIL TUBE: 0.03 IU/ML
QUANTIFERON TB1 TUBE: 0.03 IU/ML
QUANTIFERON TB2 TUBE: 0.03

## 2025-02-13 LAB — HGB BLD-MCNC: 8.6 G/DL (ref 13.3–17.7)

## 2025-02-13 PROCEDURE — P9603 ONE-WAY ALLOW PRORATED MILES: HCPCS | Mod: ORL | Performed by: NURSE PRACTITIONER

## 2025-02-13 PROCEDURE — 85018 HEMOGLOBIN: CPT | Mod: ORL | Performed by: NURSE PRACTITIONER

## 2025-02-13 PROCEDURE — 36415 COLL VENOUS BLD VENIPUNCTURE: CPT | Mod: ORL | Performed by: NURSE PRACTITIONER
